# Patient Record
Sex: MALE | Race: WHITE | NOT HISPANIC OR LATINO | URBAN - METROPOLITAN AREA
[De-identification: names, ages, dates, MRNs, and addresses within clinical notes are randomized per-mention and may not be internally consistent; named-entity substitution may affect disease eponyms.]

---

## 2018-12-23 ENCOUNTER — INPATIENT (INPATIENT)
Facility: HOSPITAL | Age: 78
LOS: 1 days | Discharge: HOME | End: 2018-12-25
Attending: INTERNAL MEDICINE | Admitting: INTERNAL MEDICINE
Payer: MEDICARE

## 2018-12-23 VITALS
RESPIRATION RATE: 18 BRPM | HEART RATE: 126 BPM | TEMPERATURE: 97 F | SYSTOLIC BLOOD PRESSURE: 143 MMHG | OXYGEN SATURATION: 98 % | DIASTOLIC BLOOD PRESSURE: 98 MMHG

## 2018-12-23 DIAGNOSIS — I48.91 UNSPECIFIED ATRIAL FIBRILLATION: ICD-10-CM

## 2018-12-23 DIAGNOSIS — N40.0 BENIGN PROSTATIC HYPERPLASIA WITHOUT LOWER URINARY TRACT SYMPTOMS: ICD-10-CM

## 2018-12-23 DIAGNOSIS — I10 ESSENTIAL (PRIMARY) HYPERTENSION: ICD-10-CM

## 2018-12-23 DIAGNOSIS — I25.10 ATHEROSCLEROTIC HEART DISEASE OF NATIVE CORONARY ARTERY WITHOUT ANGINA PECTORIS: ICD-10-CM

## 2018-12-23 DIAGNOSIS — E78.00 PURE HYPERCHOLESTEROLEMIA, UNSPECIFIED: ICD-10-CM

## 2018-12-23 DIAGNOSIS — M10.9 GOUT, UNSPECIFIED: ICD-10-CM

## 2018-12-23 LAB
ALBUMIN SERPL ELPH-MCNC: 4.5 G/DL — SIGNIFICANT CHANGE UP (ref 3.3–5)
ALP SERPL-CCNC: 81 U/L — SIGNIFICANT CHANGE UP (ref 40–120)
ALT FLD-CCNC: 19 U/L — SIGNIFICANT CHANGE UP (ref 4–41)
APTT BLD: 30.4 SEC — SIGNIFICANT CHANGE UP (ref 27.5–36.3)
AST SERPL-CCNC: 29 U/L — SIGNIFICANT CHANGE UP (ref 4–40)
BASE EXCESS BLDV CALC-SCNC: -2 MMOL/L — SIGNIFICANT CHANGE UP
BASOPHILS # BLD AUTO: 0.03 K/UL — SIGNIFICANT CHANGE UP (ref 0–0.2)
BASOPHILS NFR BLD AUTO: 0.3 % — SIGNIFICANT CHANGE UP (ref 0–2)
BILIRUB SERPL-MCNC: 0.6 MG/DL — SIGNIFICANT CHANGE UP (ref 0.2–1.2)
BLOOD GAS VENOUS - CREATININE: 0.92 MG/DL — SIGNIFICANT CHANGE UP (ref 0.5–1.3)
BUN SERPL-MCNC: 18 MG/DL — SIGNIFICANT CHANGE UP (ref 7–23)
CALCIUM SERPL-MCNC: 9.8 MG/DL — SIGNIFICANT CHANGE UP (ref 8.4–10.5)
CHLORIDE BLDV-SCNC: 108 MMOL/L — SIGNIFICANT CHANGE UP (ref 96–108)
CHLORIDE SERPL-SCNC: 104 MMOL/L — SIGNIFICANT CHANGE UP (ref 98–107)
CK SERPL-CCNC: 79 U/L — SIGNIFICANT CHANGE UP (ref 30–200)
CO2 SERPL-SCNC: 20 MMOL/L — LOW (ref 22–31)
CREAT SERPL-MCNC: 1.08 MG/DL — SIGNIFICANT CHANGE UP (ref 0.5–1.3)
EOSINOPHIL # BLD AUTO: 0.19 K/UL — SIGNIFICANT CHANGE UP (ref 0–0.5)
EOSINOPHIL NFR BLD AUTO: 2.1 % — SIGNIFICANT CHANGE UP (ref 0–6)
GAS PNL BLDV: 144 MMOL/L — SIGNIFICANT CHANGE UP (ref 136–146)
GLUCOSE BLDV-MCNC: 138 — HIGH (ref 70–99)
GLUCOSE SERPL-MCNC: 125 MG/DL — HIGH (ref 70–99)
HCO3 BLDV-SCNC: 21 MMOL/L — SIGNIFICANT CHANGE UP (ref 20–27)
HCT VFR BLD CALC: 46.7 % — SIGNIFICANT CHANGE UP (ref 39–50)
HCT VFR BLDV CALC: 48 % — SIGNIFICANT CHANGE UP (ref 39–51)
HGB BLD-MCNC: 15.4 G/DL — SIGNIFICANT CHANGE UP (ref 13–17)
HGB BLDV-MCNC: 15.7 G/DL — SIGNIFICANT CHANGE UP (ref 13–17)
IMM GRANULOCYTES # BLD AUTO: 0.05 # — SIGNIFICANT CHANGE UP
IMM GRANULOCYTES NFR BLD AUTO: 0.5 % — SIGNIFICANT CHANGE UP (ref 0–1.5)
INR BLD: 1.06 — SIGNIFICANT CHANGE UP (ref 0.88–1.17)
LACTATE BLDV-MCNC: 2.8 MMOL/L — HIGH (ref 0.5–2)
LYMPHOCYTES # BLD AUTO: 2.07 K/UL — SIGNIFICANT CHANGE UP (ref 1–3.3)
LYMPHOCYTES # BLD AUTO: 22.6 % — SIGNIFICANT CHANGE UP (ref 13–44)
MAGNESIUM SERPL-MCNC: 1.9 MG/DL — SIGNIFICANT CHANGE UP (ref 1.6–2.6)
MCHC RBC-ENTMCNC: 32.2 PG — SIGNIFICANT CHANGE UP (ref 27–34)
MCHC RBC-ENTMCNC: 33 % — SIGNIFICANT CHANGE UP (ref 32–36)
MCV RBC AUTO: 97.5 FL — SIGNIFICANT CHANGE UP (ref 80–100)
MONOCYTES # BLD AUTO: 1.4 K/UL — HIGH (ref 0–0.9)
MONOCYTES NFR BLD AUTO: 15.3 % — HIGH (ref 2–14)
NEUTROPHILS # BLD AUTO: 5.41 K/UL — SIGNIFICANT CHANGE UP (ref 1.8–7.4)
NEUTROPHILS NFR BLD AUTO: 59.2 % — SIGNIFICANT CHANGE UP (ref 43–77)
NRBC # FLD: 0 — SIGNIFICANT CHANGE UP
NT-PROBNP SERPL-SCNC: 874.4 PG/ML — SIGNIFICANT CHANGE UP
PCO2 BLDV: 50 MMHG — SIGNIFICANT CHANGE UP (ref 41–51)
PH BLDV: 7.3 PH — LOW (ref 7.32–7.43)
PHOSPHATE SERPL-MCNC: 4 MG/DL — SIGNIFICANT CHANGE UP (ref 2.5–4.5)
PLATELET # BLD AUTO: 166 K/UL — SIGNIFICANT CHANGE UP (ref 150–400)
PMV BLD: 12.2 FL — SIGNIFICANT CHANGE UP (ref 7–13)
PO2 BLDV: 37 MMHG — SIGNIFICANT CHANGE UP (ref 35–40)
POTASSIUM BLDV-SCNC: 3.8 MMOL/L — SIGNIFICANT CHANGE UP (ref 3.4–4.5)
POTASSIUM SERPL-MCNC: 4.3 MMOL/L — SIGNIFICANT CHANGE UP (ref 3.5–5.3)
POTASSIUM SERPL-SCNC: 4.3 MMOL/L — SIGNIFICANT CHANGE UP (ref 3.5–5.3)
PROT SERPL-MCNC: 7.9 G/DL — SIGNIFICANT CHANGE UP (ref 6–8.3)
PROTHROM AB SERPL-ACNC: 11.8 SEC — SIGNIFICANT CHANGE UP (ref 9.8–13.1)
RBC # BLD: 4.79 M/UL — SIGNIFICANT CHANGE UP (ref 4.2–5.8)
RBC # FLD: 13.2 % — SIGNIFICANT CHANGE UP (ref 10.3–14.5)
SAO2 % BLDV: 60.8 % — SIGNIFICANT CHANGE UP (ref 60–85)
SODIUM SERPL-SCNC: 143 MMOL/L — SIGNIFICANT CHANGE UP (ref 135–145)
TROPONIN T, HIGH SENSITIVITY: 13 NG/L — SIGNIFICANT CHANGE UP (ref ?–14)
TSH SERPL-MCNC: 3.45 UIU/ML — SIGNIFICANT CHANGE UP (ref 0.27–4.2)
WBC # BLD: 9.15 K/UL — SIGNIFICANT CHANGE UP (ref 3.8–10.5)
WBC # FLD AUTO: 9.15 K/UL — SIGNIFICANT CHANGE UP (ref 3.8–10.5)

## 2018-12-23 PROCEDURE — 71046 X-RAY EXAM CHEST 2 VIEWS: CPT | Mod: 26

## 2018-12-23 RX ORDER — SODIUM CHLORIDE 9 MG/ML
1000 INJECTION INTRAMUSCULAR; INTRAVENOUS; SUBCUTANEOUS ONCE
Qty: 0 | Refills: 0 | Status: COMPLETED | OUTPATIENT
Start: 2018-12-23 | End: 2018-12-23

## 2018-12-23 RX ORDER — ALLOPURINOL 300 MG
0 TABLET ORAL
Qty: 90 | Refills: 0 | COMMUNITY

## 2018-12-23 RX ORDER — ALLOPURINOL 300 MG
300 TABLET ORAL DAILY
Qty: 0 | Refills: 0 | Status: DISCONTINUED | OUTPATIENT
Start: 2018-12-23 | End: 2018-12-25

## 2018-12-23 RX ORDER — ATENOLOL 25 MG/1
1 TABLET ORAL
Qty: 0 | Refills: 0 | COMMUNITY

## 2018-12-23 RX ORDER — PYRIDOXINE HCL (VITAMIN B6) 100 MG
100 TABLET ORAL DAILY
Qty: 0 | Refills: 0 | Status: DISCONTINUED | OUTPATIENT
Start: 2018-12-23 | End: 2018-12-25

## 2018-12-23 RX ORDER — GABAPENTIN 400 MG/1
0 CAPSULE ORAL
Qty: 180 | Refills: 0 | COMMUNITY

## 2018-12-23 RX ORDER — FINASTERIDE 5 MG/1
0 TABLET, FILM COATED ORAL
Qty: 90 | Refills: 0 | COMMUNITY

## 2018-12-23 RX ORDER — ATENOLOL 25 MG/1
25 TABLET ORAL DAILY
Qty: 0 | Refills: 0 | Status: DISCONTINUED | OUTPATIENT
Start: 2018-12-23 | End: 2018-12-25

## 2018-12-23 RX ORDER — SIMVASTATIN 20 MG/1
10 TABLET, FILM COATED ORAL AT BEDTIME
Qty: 0 | Refills: 0 | Status: DISCONTINUED | OUTPATIENT
Start: 2018-12-23 | End: 2018-12-25

## 2018-12-23 RX ORDER — SODIUM CHLORIDE 9 MG/ML
3 INJECTION INTRAMUSCULAR; INTRAVENOUS; SUBCUTANEOUS EVERY 8 HOURS
Qty: 0 | Refills: 0 | Status: DISCONTINUED | OUTPATIENT
Start: 2018-12-23 | End: 2018-12-25

## 2018-12-23 RX ORDER — HEPARIN SODIUM 5000 [USP'U]/ML
5000 INJECTION INTRAVENOUS; SUBCUTANEOUS EVERY 12 HOURS
Qty: 0 | Refills: 0 | Status: DISCONTINUED | OUTPATIENT
Start: 2018-12-23 | End: 2018-12-24

## 2018-12-23 RX ORDER — SIMVASTATIN 20 MG/1
0 TABLET, FILM COATED ORAL
Qty: 90 | Refills: 0 | COMMUNITY

## 2018-12-23 RX ORDER — CLOPIDOGREL BISULFATE 75 MG/1
75 TABLET, FILM COATED ORAL DAILY
Qty: 0 | Refills: 0 | Status: DISCONTINUED | OUTPATIENT
Start: 2018-12-23 | End: 2018-12-24

## 2018-12-23 RX ORDER — CHOLECALCIFEROL (VITAMIN D3) 125 MCG
400 CAPSULE ORAL DAILY
Qty: 0 | Refills: 0 | Status: DISCONTINUED | OUTPATIENT
Start: 2018-12-23 | End: 2018-12-25

## 2018-12-23 RX ORDER — GABAPENTIN 400 MG/1
100 CAPSULE ORAL DAILY
Qty: 0 | Refills: 0 | Status: DISCONTINUED | OUTPATIENT
Start: 2018-12-23 | End: 2018-12-25

## 2018-12-23 RX ORDER — PYRIDOXINE HCL (VITAMIN B6) 100 MG
1 TABLET ORAL
Qty: 0 | Refills: 0 | COMMUNITY

## 2018-12-23 RX ORDER — ASPIRIN/CALCIUM CARB/MAGNESIUM 324 MG
325 TABLET ORAL DAILY
Qty: 0 | Refills: 0 | Status: DISCONTINUED | OUTPATIENT
Start: 2018-12-23 | End: 2018-12-24

## 2018-12-23 RX ORDER — ERGOCALCIFEROL 1.25 MG/1
1 CAPSULE ORAL
Qty: 0 | Refills: 0 | COMMUNITY

## 2018-12-23 RX ORDER — ASPIRIN/CALCIUM CARB/MAGNESIUM 324 MG
1 TABLET ORAL
Qty: 0 | Refills: 0 | COMMUNITY

## 2018-12-23 RX ORDER — FINASTERIDE 5 MG/1
5 TABLET, FILM COATED ORAL DAILY
Qty: 0 | Refills: 0 | Status: DISCONTINUED | OUTPATIENT
Start: 2018-12-23 | End: 2018-12-25

## 2018-12-23 RX ADMIN — SODIUM CHLORIDE 1000 MILLILITER(S): 9 INJECTION INTRAMUSCULAR; INTRAVENOUS; SUBCUTANEOUS at 17:32

## 2018-12-23 RX ADMIN — SODIUM CHLORIDE 3 MILLILITER(S): 9 INJECTION INTRAMUSCULAR; INTRAVENOUS; SUBCUTANEOUS at 21:34

## 2018-12-23 RX ADMIN — GABAPENTIN 100 MILLIGRAM(S): 400 CAPSULE ORAL at 23:22

## 2018-12-23 RX ADMIN — SIMVASTATIN 10 MILLIGRAM(S): 20 TABLET, FILM COATED ORAL at 23:22

## 2018-12-23 NOTE — H&P ADULT - FAMILY HISTORY
Father  Still living? No  Family history of cancer of mouth, Age at diagnosis: Age Unknown     Mother  Still living? No  Family history of dementia, Age at diagnosis: Age Unknown     Sibling  Still living? No  Family history of lung cancer, Age at diagnosis: Age Unknown

## 2018-12-23 NOTE — ED PROVIDER NOTE - ATTENDING CONTRIBUTION TO CARE
I performed a face-to-face evaluation of the patient and performed a history and physical examination. I agree with the history and physical examination.    Trixie: New-onset a. fib. -120. Check TSH, trop, CXR. Admit for heparin drip and starting anticoagulation.

## 2018-12-23 NOTE — ED ADULT NURSE NOTE - CHPI ED NUR SYMPTOMS NEG
no shortness of breath/no fever/no syncope/no vomiting/no nausea/no chest pain/no dizziness/no diaphoresis

## 2018-12-23 NOTE — ED ADULT NURSE NOTE - PSH
CABG (Coronary Artery Bypass Graft)  x4 in 2000  Cataract cortical, senile    Clubfoot, congenital    History of Colonoscopy    Incarcerated Inguinal Hernia, Unilateral    Stented coronary artery  2 stents in 2010

## 2018-12-23 NOTE — ED ADULT NURSE NOTE - OBJECTIVE STATEMENT
Facilitator RN:  received pt in room 4, c/o diarrhea x3 days.  Pt went to Urgent Care, had EKG done.  EKG shows new onset A-fib.  Pt denies history of A-fib/abnormal heart rate.  PMHx cardiac bypass, stents x6.  Pt AAOx3, respirations even and unlabored.  A-fib on monitor.  Pt denies shortness of breath, N/V.  Labs drawn and sent; IV access obtained.  Report given to primary RN Nhi.

## 2018-12-23 NOTE — ED PROVIDER NOTE - MEDICAL DECISION MAKING DETAILS
Trixie: New-onset a. fib. -120. Check TSH, trop, CXR. Admit for heparin drip and starting anticoagulation.

## 2018-12-23 NOTE — H&P ADULT - PROBLEM SELECTOR PLAN 1
tele  repeat echo  cardiac eval  c/w Atenolol  case d/w Attg Dr Segovia - will hold off on AC for now, await cardiac eval

## 2018-12-23 NOTE — ED PROVIDER NOTE - MUSCULOSKELETAL, MLM
Spine appears normal, range of motion is not limited, no muscle or joint tenderness L calf atrophied 2/2 disuse from L club foot . No LE edema.

## 2018-12-23 NOTE — H&P ADULT - HISTORY OF PRESENT ILLNESS
This is a  79 yo  Male with pmh/o , CAD (CABG x 4, stents x 6- last done about 7 yrs ago at [facility])), HTN, Arthritis, gout, HLD, BPH, was on digoxin in past ( was taken off about 3-4 yrs ago)  Went to [facility] urgent care today for flu-like Sx and diarrhea - which stopped today (was on Abx for 10 days last month for URI ). At [facility] found to be  Afib w/ RVR. Pt  only c/o diffuse myalgias. Denies any palpitaions, chest pain, SOB, Nausea, vomiting, abdominal pain. Pt appears comfortable NAD, AO x3, CP free.

## 2018-12-23 NOTE — ED PROVIDER NOTE - PMH
Arthritis, Gouty    BPH (Benign Prostatic Hypertrophy)    CAD (Coronary Artery Disease)    Hypercholesteremia    Hypertension

## 2018-12-23 NOTE — H&P ADULT - NSHPLABSRESULTS_GEN_ALL_CORE
15.4   9.15  )-----------( 166      ( 23 Dec 2018 15:40 )             46.7   12-23    143  |  104  |  18  ----------------------------<  125<H>  4.3   |  20<L>  |  1.08    Ca    9.8      23 Dec 2018 15:40  Phos  4.0     12-23  Mg     1.9     12-23    TPro  7.9  /  Alb  4.5  /  TBili  0.6  /  DBili  x   /  AST  29  /  ALT  19  /  AlkPhos  81  12-23

## 2018-12-23 NOTE — ED ADULT TRIAGE NOTE - CHIEF COMPLAINT QUOTE
states" I am having diarrhea since Wednesday and went to [facility] and EKG shows Irregular heart beat. denies any complaints. EKG shows rapid Afib.

## 2018-12-23 NOTE — ED ADULT NURSE NOTE - CHIEF COMPLAINT QUOTE
states" I am having diarrhea since Wednesday and went to [facility] and EKG shows Irregular heart beat. denies any complaints. EKG shows rapid Afib.

## 2018-12-23 NOTE — ED PROVIDER NOTE - OBJECTIVE STATEMENT
Trixie: 78 M, CAD (CABG, stents). Went to [facility] urgent care for flu-like Sx and diarrhea (was on Abx last month), which stopped today. Found there to be in a. fib.    Meds:  allopurinol  ASA  Enalapril  Simva  Finasteride  Neurontin  Atenolol Trixie: 78 M, CAD (CABG, stents), htn, was on digoxin in past. Went to [facility] urgent care for flu-like Sx and diarrhea (was on Abx last month), which stopped today. Found there to be in a. fib. only c/o diffuse myalgias. ROS negative for: fever, chest pain, SOB, Nausea, vomiting, diarrhea, abdominal pain,     Meds:  allopurinol  ASA  Enalapril  Simva  Finasteride  Neurontin  Atenolol

## 2018-12-24 LAB
ALBUMIN SERPL ELPH-MCNC: 3.9 G/DL — SIGNIFICANT CHANGE UP (ref 3.3–5)
ALP SERPL-CCNC: 65 U/L — SIGNIFICANT CHANGE UP (ref 40–120)
ALT FLD-CCNC: 16 U/L — SIGNIFICANT CHANGE UP (ref 4–41)
AST SERPL-CCNC: 16 U/L — SIGNIFICANT CHANGE UP (ref 4–40)
BASOPHILS # BLD AUTO: 0.03 K/UL — SIGNIFICANT CHANGE UP (ref 0–0.2)
BASOPHILS NFR BLD AUTO: 0.5 % — SIGNIFICANT CHANGE UP (ref 0–2)
BILIRUB SERPL-MCNC: 0.7 MG/DL — SIGNIFICANT CHANGE UP (ref 0.2–1.2)
BUN SERPL-MCNC: 16 MG/DL — SIGNIFICANT CHANGE UP (ref 7–23)
C DIFF TOX GENS STL QL NAA+PROBE: SIGNIFICANT CHANGE UP
CALCIUM SERPL-MCNC: 9 MG/DL — SIGNIFICANT CHANGE UP (ref 8.4–10.5)
CHLORIDE SERPL-SCNC: 106 MMOL/L — SIGNIFICANT CHANGE UP (ref 98–107)
CHOLEST SERPL-MCNC: 138 MG/DL — SIGNIFICANT CHANGE UP (ref 120–199)
CO2 SERPL-SCNC: 21 MMOL/L — LOW (ref 22–31)
CREAT SERPL-MCNC: 0.92 MG/DL — SIGNIFICANT CHANGE UP (ref 0.5–1.3)
EOSINOPHIL # BLD AUTO: 0.32 K/UL — SIGNIFICANT CHANGE UP (ref 0–0.5)
EOSINOPHIL NFR BLD AUTO: 5 % — SIGNIFICANT CHANGE UP (ref 0–6)
GLUCOSE SERPL-MCNC: 94 MG/DL — SIGNIFICANT CHANGE UP (ref 70–99)
HBA1C BLD-MCNC: 5.8 % — HIGH (ref 4–5.6)
HCT VFR BLD CALC: 41.7 % — SIGNIFICANT CHANGE UP (ref 39–50)
HDLC SERPL-MCNC: 41 MG/DL — SIGNIFICANT CHANGE UP (ref 35–55)
HGB BLD-MCNC: 13.9 G/DL — SIGNIFICANT CHANGE UP (ref 13–17)
IMM GRANULOCYTES # BLD AUTO: 0.02 # — SIGNIFICANT CHANGE UP
IMM GRANULOCYTES NFR BLD AUTO: 0.3 % — SIGNIFICANT CHANGE UP (ref 0–1.5)
LIPID PNL WITH DIRECT LDL SERPL: 81 MG/DL — SIGNIFICANT CHANGE UP
LYMPHOCYTES # BLD AUTO: 1.36 K/UL — SIGNIFICANT CHANGE UP (ref 1–3.3)
LYMPHOCYTES # BLD AUTO: 21.2 % — SIGNIFICANT CHANGE UP (ref 13–44)
MAGNESIUM SERPL-MCNC: 1.8 MG/DL — SIGNIFICANT CHANGE UP (ref 1.6–2.6)
MCHC RBC-ENTMCNC: 32.8 PG — SIGNIFICANT CHANGE UP (ref 27–34)
MCHC RBC-ENTMCNC: 33.3 % — SIGNIFICANT CHANGE UP (ref 32–36)
MCV RBC AUTO: 98.3 FL — SIGNIFICANT CHANGE UP (ref 80–100)
MONOCYTES # BLD AUTO: 0.86 K/UL — SIGNIFICANT CHANGE UP (ref 0–0.9)
MONOCYTES NFR BLD AUTO: 13.4 % — SIGNIFICANT CHANGE UP (ref 2–14)
NEUTROPHILS # BLD AUTO: 3.83 K/UL — SIGNIFICANT CHANGE UP (ref 1.8–7.4)
NEUTROPHILS NFR BLD AUTO: 59.6 % — SIGNIFICANT CHANGE UP (ref 43–77)
NRBC # FLD: 0 — SIGNIFICANT CHANGE UP
PHOSPHATE SERPL-MCNC: 3.1 MG/DL — SIGNIFICANT CHANGE UP (ref 2.5–4.5)
PLATELET # BLD AUTO: 143 K/UL — LOW (ref 150–400)
PMV BLD: 11.8 FL — SIGNIFICANT CHANGE UP (ref 7–13)
POTASSIUM SERPL-MCNC: 3.7 MMOL/L — SIGNIFICANT CHANGE UP (ref 3.5–5.3)
POTASSIUM SERPL-SCNC: 3.7 MMOL/L — SIGNIFICANT CHANGE UP (ref 3.5–5.3)
PROT SERPL-MCNC: 6.5 G/DL — SIGNIFICANT CHANGE UP (ref 6–8.3)
RBC # BLD: 4.24 M/UL — SIGNIFICANT CHANGE UP (ref 4.2–5.8)
RBC # FLD: 13.2 % — SIGNIFICANT CHANGE UP (ref 10.3–14.5)
SODIUM SERPL-SCNC: 141 MMOL/L — SIGNIFICANT CHANGE UP (ref 135–145)
TRIGL SERPL-MCNC: 145 MG/DL — SIGNIFICANT CHANGE UP (ref 10–149)
TSH SERPL-MCNC: 3.43 UIU/ML — SIGNIFICANT CHANGE UP (ref 0.27–4.2)
WBC # BLD: 6.42 K/UL — SIGNIFICANT CHANGE UP (ref 3.8–10.5)
WBC # FLD AUTO: 6.42 K/UL — SIGNIFICANT CHANGE UP (ref 3.8–10.5)

## 2018-12-24 RX ORDER — APIXABAN 2.5 MG/1
5 TABLET, FILM COATED ORAL EVERY 12 HOURS
Qty: 0 | Refills: 0 | Status: DISCONTINUED | OUTPATIENT
Start: 2018-12-24 | End: 2018-12-25

## 2018-12-24 RX ORDER — ASPIRIN/CALCIUM CARB/MAGNESIUM 324 MG
81 TABLET ORAL DAILY
Qty: 0 | Refills: 0 | Status: DISCONTINUED | OUTPATIENT
Start: 2018-12-24 | End: 2018-12-25

## 2018-12-24 RX ORDER — APIXABAN 2.5 MG/1
2 TABLET, FILM COATED ORAL
Qty: 120 | Refills: 0 | OUTPATIENT
Start: 2018-12-24 | End: 2019-01-22

## 2018-12-24 RX ADMIN — Medication 400 UNIT(S): at 12:56

## 2018-12-24 RX ADMIN — SODIUM CHLORIDE 3 MILLILITER(S): 9 INJECTION INTRAMUSCULAR; INTRAVENOUS; SUBCUTANEOUS at 05:00

## 2018-12-24 RX ADMIN — Medication 300 MILLIGRAM(S): at 12:56

## 2018-12-24 RX ADMIN — Medication 10 MILLIGRAM(S): at 05:01

## 2018-12-24 RX ADMIN — ATENOLOL 25 MILLIGRAM(S): 25 TABLET ORAL at 05:01

## 2018-12-24 RX ADMIN — SODIUM CHLORIDE 3 MILLILITER(S): 9 INJECTION INTRAMUSCULAR; INTRAVENOUS; SUBCUTANEOUS at 21:12

## 2018-12-24 RX ADMIN — SIMVASTATIN 10 MILLIGRAM(S): 20 TABLET, FILM COATED ORAL at 21:15

## 2018-12-24 RX ADMIN — Medication 325 MILLIGRAM(S): at 12:56

## 2018-12-24 RX ADMIN — CLOPIDOGREL BISULFATE 75 MILLIGRAM(S): 75 TABLET, FILM COATED ORAL at 12:56

## 2018-12-24 RX ADMIN — SODIUM CHLORIDE 3 MILLILITER(S): 9 INJECTION INTRAMUSCULAR; INTRAVENOUS; SUBCUTANEOUS at 13:59

## 2018-12-24 RX ADMIN — Medication 100 MILLIGRAM(S): at 12:58

## 2018-12-24 RX ADMIN — GABAPENTIN 100 MILLIGRAM(S): 400 CAPSULE ORAL at 12:56

## 2018-12-24 RX ADMIN — FINASTERIDE 5 MILLIGRAM(S): 5 TABLET, FILM COATED ORAL at 12:56

## 2018-12-24 RX ADMIN — APIXABAN 5 MILLIGRAM(S): 2.5 TABLET, FILM COATED ORAL at 17:59

## 2018-12-24 NOTE — CONSULT NOTE ADULT - SUBJECTIVE AND OBJECTIVE BOX
CARDIOLOGY ATTENDING      HISTORY OF PRESENT ILLNESS: He is a pleasant 73 y/o male PMH CAD s/p CABG and subsequently PCI (2013) admitted with asymptomatic newly diagnosed AF. He has already converted back to NSR. Reports having a normal echo with his cardiologist Dr Briceno. He denies any symptoms from his AF, denying palpitations, syncope, nor angina.    PAST MEDICAL & SURGICAL HISTORY:  BPH (Benign Prostatic Hypertrophy)  CAD (Coronary Artery Disease) s/p CABG and PCI (2013)  Hypercholesteremia  Hypertension  paroxysmal atrial fibrillation    MEDICATIONS  (STANDING):  allopurinol 300 milliGRAM(s) Oral daily  apixaban 5 milliGRAM(s) Oral every 12 hours  aspirin enteric coated 81 milliGRAM(s) Oral daily  ATENolol  Tablet 25 milliGRAM(s) Oral daily  cholecalciferol 400 Unit(s) Oral daily  enalapril 10 milliGRAM(s) Oral daily  finasteride 5 milliGRAM(s) Oral daily  gabapentin 100 milliGRAM(s) Oral daily  pyridoxine 100 milliGRAM(s) Oral daily  simvastatin 10 milliGRAM(s) Oral at bedtime  sodium chloride 0.9% lock flush 3 milliLiter(s) IV Push every 8 hours      Allergies    No Known Allergies    Intolerances        FAMILY HISTORY:  Family history of lung cancer (Sibling)  Family history of dementia (Mother)  Family history of cancer of mouth (Father): father    Non-contributary for premature coronary disease or sudden cardiac death    SOCIAL HISTORY:    [x ] Non-smoker  [ ] Smoker  [ ] Alcohol      REVIEW OF SYSTEMS:  [ ]chest pain  [  ]shortness of breath  [  ]palpitations  [  ]syncope  [ ]near syncope [ ]upper extremity weakness   [ ] lower extremity weakness  [  ]diplopia  [  ]altered mental status   [  ]fevers  [ ]chills [ ]nausea  [ ]vomitting  [  ]dysphagia    [ ]abdominal pain  [ ]melena  [ ]BRBPR    [  ]epistaxis  [  ]rash    [ ]lower extremity edema        [x ] All others negative	  [ ] Unable to obtain    PHYSICAL EXAM:  T(C): 36.6 (12-24-18 @ 14:42), Max: 36.9 (12-24-18 @ 10:22)  HR: 75 (12-24-18 @ 14:42) (72 - 126)  BP: 119/71 (12-24-18 @ 14:42) (119/71 - 152/91)  RR: 18 (12-24-18 @ 14:42) (16 - 18)  SpO2: 96% (12-24-18 @ 14:42) (95% - 100%)  Wt(kg): --    no JVD  RRR, no murmurs  CTAB  soft nt/nd  no c/c/e      TELEMETRY: 	    ECG:  	    Echo:  NST:  Cath:  	  	  LABS:	 	                          13.9   6.42  )-----------( 143      ( 24 Dec 2018 05:48 )             41.7     12-24    141  |  106  |  16  ----------------------------<  94  3.7   |  21<L>  |  0.92    Ca    9.0      24 Dec 2018 05:48  Phos  3.1     12-24  Mg     1.8     12-24    TPro  6.5  /  Alb  3.9  /  TBili  0.7  /  DBili  x   /  AST  16  /  ALT  16  /  AlkPhos  65  12-24    proBNP: Serum Pro-Brain Natriuretic Peptide: 874.4 pg/mL (12-23 @ 15:40)    Lipid Profile:   HgA1c: Hemoglobin A1C, Whole Blood: 5.8 % (12-24 @ 05:48)    TSH: Thyroid Stimulating Hormone, Serum: 3.43 uIU/mL (12-24 @ 05:48)  Thyroid Stimulating Hormone, Serum: 3.45 uIU/mL (12-23 @ 15:40)      A/P) He is a pleasant 73 y/o male PMH CAD s/p CABG and subsequently PCI (2013) admitted with asymptomatic newly diagnosed AF. He has already converted back to NSR. Reports having a normal echo with his cardiologist Dr Briceno. He denies any symptoms from his AF, denying palpitations, syncope, nor angina. CHADS-VASC 4    -decrease asa to 81mg daily and d/c plavix  -start eliquis 5 bid for lifelong a/c. Please make sure eliquis is covered by his Rx plan  -d/c home  -would not pursue an aggressive rhythm control strategy (AAD or AF ablation) as he's asymptomatic  -f/u with his cardiologist Dr Briceno after discharge      Jeremias Payne M.D., [facility]  Cardiac Electrophysiology  [facility]  [address] [place] [address], [address] [place] [address], [place] [address]  [email or web address]    office [phone]  pager [phone]

## 2018-12-24 NOTE — PROVIDER CONTACT NOTE (OTHER) - ASSESSMENT
Patient denies chest pain, shortness of breath, palpitations and dizziness
Pt has saturated multiple finger dressings from  fingerstic site

## 2018-12-25 ENCOUNTER — TRANSCRIPTION ENCOUNTER (OUTPATIENT)
Age: 78
End: 2018-12-25

## 2018-12-25 VITALS — WEIGHT: 186.07 LBS

## 2018-12-25 LAB
BUN SERPL-MCNC: 15 MG/DL — SIGNIFICANT CHANGE UP (ref 7–23)
CALCIUM SERPL-MCNC: 9 MG/DL — SIGNIFICANT CHANGE UP (ref 8.4–10.5)
CHLORIDE SERPL-SCNC: 105 MMOL/L — SIGNIFICANT CHANGE UP (ref 98–107)
CO2 SERPL-SCNC: 19 MMOL/L — LOW (ref 22–31)
CREAT SERPL-MCNC: 0.85 MG/DL — SIGNIFICANT CHANGE UP (ref 0.5–1.3)
GLUCOSE SERPL-MCNC: 107 MG/DL — HIGH (ref 70–99)
HCT VFR BLD CALC: 42 % — SIGNIFICANT CHANGE UP (ref 39–50)
HGB BLD-MCNC: 14.2 G/DL — SIGNIFICANT CHANGE UP (ref 13–17)
MCHC RBC-ENTMCNC: 31.9 PG — SIGNIFICANT CHANGE UP (ref 27–34)
MCHC RBC-ENTMCNC: 33.8 % — SIGNIFICANT CHANGE UP (ref 32–36)
MCV RBC AUTO: 94.4 FL — SIGNIFICANT CHANGE UP (ref 80–100)
NRBC # FLD: 0 — SIGNIFICANT CHANGE UP
PLATELET # BLD AUTO: 163 K/UL — SIGNIFICANT CHANGE UP (ref 150–400)
PMV BLD: 12 FL — SIGNIFICANT CHANGE UP (ref 7–13)
POTASSIUM SERPL-MCNC: 3.6 MMOL/L — SIGNIFICANT CHANGE UP (ref 3.5–5.3)
POTASSIUM SERPL-SCNC: 3.6 MMOL/L — SIGNIFICANT CHANGE UP (ref 3.5–5.3)
RBC # BLD: 4.45 M/UL — SIGNIFICANT CHANGE UP (ref 4.2–5.8)
RBC # FLD: 13.1 % — SIGNIFICANT CHANGE UP (ref 10.3–14.5)
SODIUM SERPL-SCNC: 140 MMOL/L — SIGNIFICANT CHANGE UP (ref 135–145)
WBC # BLD: 7.07 K/UL — SIGNIFICANT CHANGE UP (ref 3.8–10.5)
WBC # FLD AUTO: 7.07 K/UL — SIGNIFICANT CHANGE UP (ref 3.8–10.5)

## 2018-12-25 RX ORDER — APIXABAN 2.5 MG/1
1 TABLET, FILM COATED ORAL
Qty: 60 | Refills: 0 | OUTPATIENT
Start: 2018-12-25 | End: 2019-01-23

## 2018-12-25 RX ORDER — CLOPIDOGREL BISULFATE 75 MG/1
0 TABLET, FILM COATED ORAL
Qty: 90 | Refills: 0 | COMMUNITY

## 2018-12-25 RX ADMIN — Medication 10 MILLIGRAM(S): at 05:08

## 2018-12-25 RX ADMIN — APIXABAN 5 MILLIGRAM(S): 2.5 TABLET, FILM COATED ORAL at 05:08

## 2018-12-25 RX ADMIN — ATENOLOL 25 MILLIGRAM(S): 25 TABLET ORAL at 05:08

## 2018-12-25 RX ADMIN — SODIUM CHLORIDE 3 MILLILITER(S): 9 INJECTION INTRAMUSCULAR; INTRAVENOUS; SUBCUTANEOUS at 05:08

## 2018-12-25 NOTE — DISCHARGE NOTE ADULT - MEDICATION SUMMARY - MEDICATIONS TO STOP TAKING
I will STOP taking the medications listed below when I get home from the hospital:    CLOPIDOGREL  TAB 75MG

## 2018-12-25 NOTE — DISCHARGE NOTE ADULT - MEDICATION SUMMARY - MEDICATIONS TO TAKE
I will START or STAY ON the medications listed below when I get home from the hospital:    FINASTERIDE  TAB 5MG  -- Indication: For BPH (benign prostatic hyperplasia)    Aspirin Enteric Coated 325 mg oral delayed release tablet  -- 1 tab(s) by mouth once a day  -- Indication: For CAD (Coronary Artery Disease)    ENALAPRIL    TAB 10MG  -- Indication: For CAD (Coronary Artery Disease)    apixaban 5 mg oral tablet  -- 1 tab(s) by mouth every 12 hours  -- Indication: For Afib    GABAPENTIN   CAP 100MG  -- Indication: For dm    ALLOPURINOL  TAB 300MG  -- Indication: For gout    SIMVASTATIN  TAB 10MG  -- Indication: For Hld    atenolol 25 mg oral tablet  -- 1 tab(s) by mouth once a day  -- Indication: For Afib    Vitamin B6 100 mg oral tablet  -- 1 tab(s) by mouth once a day  -- Indication: For supplement    Vitamin D2 2000 intl units oral capsule  -- 1 cap(s) by mouth once a day  -- Indication: For supplement

## 2018-12-25 NOTE — CHART NOTE - NSCHARTNOTEFT_GEN_A_CORE
Per family and pt request they would like to take patient home today, Family reported they will take full responsibility for eliquis payment if it is not cover by the insurance. They pickup the medication from pharmacy tomorrow, in the event they pharamacy does not have supply they would call their cardiologist Dr Marquez for samples or call the [facility] floor to resend Rx to different pharmacy. Plan agreed by attending. will discharge pt today

## 2018-12-25 NOTE — PROGRESS NOTE ADULT - ATTENDING COMMENTS
Patient seen and examined.  Agree with above.   No further inpatient cardiac workup needed at this time.   Continue with single antiplt therapy and ac for cad and afib respectively    Doris Meng MD

## 2018-12-25 NOTE — PROGRESS NOTE ADULT - PROBLEM SELECTOR PLAN 1
tele  repeat echo  cardiac eval appreciated  c/w Atenolol  will check pharamcy to see if eliquis corrected

## 2018-12-25 NOTE — DISCHARGE NOTE ADULT - PATIENT PORTAL LINK FT
You can access the [facility] Patient Portal, offered by [facility], by registering with the following website: [email or web address]

## 2018-12-25 NOTE — DISCHARGE NOTE ADULT - CARE PLAN
Principal Discharge DX:	Atrial fibrillation  Goal:	To restore or maintain a normal heart rate and rhythm, to prevent blood clots, and decrease the risks of stroke CVA/TIA.  Assessment and plan of treatment:	Please take your medications as prescribed. please contact your cardiologist or the [facility] floor if your pharmacy does not have supply of eliquis. Continue to take your blood thinner as prescribed and .  Low fat diet, reduce caffeine intake, and exercise at least 30 minutes daily. Follow up with your cardiologist in 1 week  Secondary Diagnosis:	CAD (Coronary Artery Disease)  Goal:	To be asymptomatic, to reduce risks factors such as hypertension, diabetes and hyperlipidemia to lower the risk of blood clots formation; and to prevent complications of coronary artery disease such as worsening chest pain, heart attack and death.  Assessment and plan of treatment:	Continue aspirin, your plavix was discontinued since your are started on eliquis for anticoagulation.   Continue low salt, fat, cholesterol and carbohydrate diet. Follow up with cardiologist and primary care physician's routine appointment.  Secondary Diagnosis:	Hypertension  Goal:	To maintain a normal blood pressure to prevent heart attack, stroke and renal failure.  Assessment and plan of treatment:	Low sodium and fat diet, continue anti-hypertensive medications, and follow up with primary care physician.  Secondary Diagnosis:	Hypercholesteremia  Goal:	To maintain normal cholesterol levels to prevent stroke, coronary artery disease, peripheral vascular disease and heart attacks.  Assessment and plan of treatment:	Low fat diet, exercise daily and continue current medications. Follow up with primary care physician and cardiologist for management.  Secondary Diagnosis:	Arthritis, Gouty  Assessment and plan of treatment:	continue home medication

## 2018-12-25 NOTE — PROGRESS NOTE ADULT - SUBJECTIVE AND OBJECTIVE BOX
Patient is a 78y old  Male who presents with a chief complaint of send by MD for new onset afib (25 Dec 2018 04:05)      SUBJECTIVE / OVERNIGHT EVENTS:    Went back into afib yesterday evening  Astympatomic currently    Vital Signs Last 24 Hrs  T(C): 37 (25 Dec 2018 04:30), Max: 37 (25 Dec 2018 04:30)  T(F): 98.6 (25 Dec 2018 04:30), Max: 98.6 (25 Dec 2018 04:30)  HR: 98 (25 Dec 2018 04:30) (75 - 98)  BP: 130/73 (25 Dec 2018 04:30) (119/71 - 152/76)  BP(mean): --  RR: 18 (25 Dec 2018 04:30) (16 - 189)  SpO2: 96% (25 Dec 2018 04:30) (96% - 98%)  I&O's Summary    24 Dec 2018 07:01  -  25 Dec 2018 07:00  --------------------------------------------------------  IN: 300 mL / OUT: 400 mL / NET: -100 mL        GENERAL: NAD, AAOx3  HEAD:  Atraumatic, Normocephalic  EYES: EOMI, PERRLA, conjunctiva and sclera clear  NECK: Supple, No JVD, No LAD  CHEST/LUNG: Clear to auscultation bilaterally; No wheeze  HEART: Irregular rate and rhythm; No murmurs, rubs, or gallops  ABDOMEN: Soft, Nontender, Nondistended; Bowel sounds present  EXTREMITIES:  2+ Peripheral Pulses, No clubbing, cyanosis, or edema  SKIN: No rashes or lesions  NEURO: nonfocal CN/motor/sensory/reflexes    LABS:                        14.2   7.07  )-----------( 163      ( 25 Dec 2018 07:04 )             42.0     12-25    140  |  105  |  15  ----------------------------<  107<H>  3.6   |  19<L>  |  0.85    Ca    9.0      25 Dec 2018 07:04  Phos  3.1     12-24  Mg     1.8     12-24    TPro  6.5  /  Alb  3.9  /  TBili  0.7  /  DBili  x   /  AST  16  /  ALT  16  /  AlkPhos  65  12-24    PT/INR - ( 23 Dec 2018 15:40 )   PT: 11.8 SEC;   INR: 1.06          PTT - ( 23 Dec 2018 15:40 )  PTT:30.4 SEC  CAPILLARY BLOOD GLUCOSE        CARDIAC MARKERS ( 23 Dec 2018 15:40 )  x     / x     / 79 u/L / x     / x              RADIOLOGY & ADDITIONAL TESTS:    Imaging Personally Reviewed:  [x] YES  [ ] NO    Consultant(s) Notes Reviewed:  [x] YES  [ ] NO      MEDICATIONS  (STANDING):  allopurinol 300 milliGRAM(s) Oral daily  apixaban 5 milliGRAM(s) Oral every 12 hours  aspirin enteric coated 81 milliGRAM(s) Oral daily  ATENolol  Tablet 25 milliGRAM(s) Oral daily  cholecalciferol 400 Unit(s) Oral daily  enalapril 10 milliGRAM(s) Oral daily  finasteride 5 milliGRAM(s) Oral daily  gabapentin 100 milliGRAM(s) Oral daily  pyridoxine 100 milliGRAM(s) Oral daily  simvastatin 10 milliGRAM(s) Oral at bedtime  sodium chloride 0.9% lock flush 3 milliLiter(s) IV Push every 8 hours    MEDICATIONS  (PRN):      Care Discussed with Consultants/Other Providers [x] YES  [ ] NO    HEALTH ISSUES - PROBLEM Dx:  BPH (benign prostatic hyperplasia): BPH (benign prostatic hyperplasia)  Hypertension: Hypertension  Hypercholesteremia: Hypercholesteremia  CAD (Coronary Artery Disease): CAD (Coronary Artery Disease)  Arthritis, Gouty: Arthritis, Gouty  Atrial fibrillation: Atrial fibrillation
Subjective:  pt seen and examined, no complaints, ROS - .   tele : Afib 80-90     allopurinol 300 milliGRAM(s) Oral daily  apixaban 5 milliGRAM(s) Oral every 12 hours  aspirin enteric coated 81 milliGRAM(s) Oral daily  ATENolol  Tablet 25 milliGRAM(s) Oral daily  cholecalciferol 400 Unit(s) Oral daily  enalapril 10 milliGRAM(s) Oral daily  finasteride 5 milliGRAM(s) Oral daily  gabapentin 100 milliGRAM(s) Oral daily  pyridoxine 100 milliGRAM(s) Oral daily  simvastatin 10 milliGRAM(s) Oral at bedtime  sodium chloride 0.9% lock flush 3 milliLiter(s) IV Push every 8 hours                            13.9   6.42  )-----------( 143      ( 24 Dec 2018 05:48 )             41.7       Hemoglobin: 13.9 g/dL (12-24 @ 05:48)  Hemoglobin: 15.4 g/dL (12-23 @ 15:40)      12-24    141  |  106  |  16  ----------------------------<  94  3.7   |  21<L>  |  0.92    Ca    9.0      24 Dec 2018 05:48  Phos  3.1     12-24  Mg     1.8     12-24    TPro  6.5  /  Alb  3.9  /  TBili  0.7  /  DBili  x   /  AST  16  /  ALT  16  /  AlkPhos  65  12-24    Creatinine Trend: 0.92<--, 1.08<--    COAGS:     CARDIAC MARKERS ( 23 Dec 2018 15:40 )  x     / x     / 79 u/L / x     / x            T(C): 36.9 (12-24-18 @ 21:11), Max: 36.9 (12-24-18 @ 10:22)  HR: 90 (12-24-18 @ 21:11) (75 - 92)  BP: 139/78 (12-24-18 @ 21:11) (119/71 - 152/76)  RR: 17 (12-24-18 @ 21:11) (16 - 189)  SpO2: 98% (12-24-18 @ 21:11) (95% - 98%)  Wt(kg): --    I&O's Summary    24 Dec 2018 07:01  -  25 Dec 2018 04:05  --------------------------------------------------------  IN: 200 mL / OUT: 0 mL / NET: 200 mL      Appearance: Normal	  HEENT:   Normal oral mucosa, PERRL, EOMI	  Lymphatic: No lymphadenopathy , no edema  Cardiovascular: Normal S1 S2, No JVD, No murmurs , Peripheral pulses palpable 2+ bilaterally  Respiratory: Lungs clear to auscultation, normal effort 	  Gastrointestinal:  Soft, Non-tender, + BS	  Skin: No rashes, No ecchymoses, No cyanosis, warm to touch  Musculoskeletal: Normal range of motion, normal strength  Psychiatry:  Mood & affect appropriate      A/P) He is a pleasant 73 y/o male PMH CAD s/p CABG and subsequently PCI (2013) admitted with asymptomatic newly diagnosed AF. He has already converted back to NSR. Reports having a normal echo with his cardiologist Dr Briceno. He denies any symptoms from his AF, denying palpitations, syncope, nor angina. CHADS-VASC 4    cont asa only with A/C  , off plavix   cont A/C with eliquis for Afib   cont tight BP control with ACE / BB   Hr better controlled with BB at present.   *would not pursue an aggressive rhythm control strategy (AAD or AF ablation) as he's asymptomatic  *f/u with his cardiologist Dr Briceno after discharge  D/W Dr Meng

## 2018-12-25 NOTE — PROGRESS NOTE ADULT - ASSESSMENT
77 yo male with PMhx of CAD (CABG x 4, stents x 6- last done about 7 yrs ago at [facility])), HTN, Arthritis, gout, HLD, BPH,, adm/w new onset Afib

## 2018-12-25 NOTE — DISCHARGE NOTE ADULT - HOSPITAL COURSE
77 yo  Male with pmh/o , CAD (CABG x 4, stents x 6- last done about 7 yrs ago at [facility])), HTN, Arthritis, gout, HLD, BPH, was on digoxin in past ( was taken off about 3-4 yrs ago)  Went to [facility] urgent care today for flu-like Sx and diarrhea - which stopped today (was on Abx for 10 days last month for URI ). At [facility] found to be  Afib w/ RVR. Pt  only c/o diffuse myalgias. Denies any palpitaions, chest pain, SOB, Nausea, vomiting, abdominal pain. Pt appears comfortable NAD, AO x3, CP free.     Hospital course:  EKG: Afib @ 104 w/ RVR  hsTrop: 13       PLT: 147            HgbA1-c: 5.8                  Gluocse: 125                 BNP: 874.4                Lactate: 2.8  12/23 CXR - no emergent finding follow up official report    Pt admitted for new onset afib later self converted to SR, TTE outpatient with preserved EF. Pt with CHADS 3 started on eliquis. Plavix discontinued given risk of triple therapy. Pt also noted with diarrhea, C diff negative like gastroenteritis. As per Dr Segovia pt cleared for discharge on 12/25

## 2018-12-25 NOTE — DISCHARGE NOTE ADULT - PLAN OF CARE
To restore or maintain a normal heart rate and rhythm, to prevent blood clots, and decrease the risks of stroke CVA/TIA. Please take your medications as prescribed. please contact your cardiologist or the [facility] floor if your pharmacy does not have supply of eliquis. Continue to take your blood thinner as prescribed and .  Low fat diet, reduce caffeine intake, and exercise at least 30 minutes daily. Follow up with your cardiologist in 1 week To be asymptomatic, to reduce risks factors such as hypertension, diabetes and hyperlipidemia to lower the risk of blood clots formation; and to prevent complications of coronary artery disease such as worsening chest pain, heart attack and death. Continue aspirin, your plavix was discontinued since your are started on eliquis for anticoagulation.   Continue low salt, fat, cholesterol and carbohydrate diet. Follow up with cardiologist and primary care physician's routine appointment. To maintain a normal blood pressure to prevent heart attack, stroke and renal failure. Low sodium and fat diet, continue anti-hypertensive medications, and follow up with primary care physician. To maintain normal cholesterol levels to prevent stroke, coronary artery disease, peripheral vascular disease and heart attacks. Low fat diet, exercise daily and continue current medications. Follow up with primary care physician and cardiologist for management. continue home medication

## 2024-08-01 ENCOUNTER — APPOINTMENT (OUTPATIENT)
Dept: NEUROLOGY | Facility: CLINIC | Age: 84
End: 2024-08-01

## 2025-07-16 ENCOUNTER — APPOINTMENT (OUTPATIENT)
Dept: VASCULAR SURGERY | Facility: CLINIC | Age: 85
End: 2025-07-16
Payer: MEDICARE

## 2025-07-16 VITALS — WEIGHT: 161 LBS | HEIGHT: 70 IN | BODY MASS INDEX: 23.05 KG/M2

## 2025-07-16 LAB
ALBUMIN SERPL ELPH-MCNC: 4.6 G/DL
ALP BLD-CCNC: 100 U/L
ALT SERPL-CCNC: 28 U/L
ANION GAP SERPL CALC-SCNC: 17 MMOL/L
APTT BLD: 39.2 SEC
AST SERPL-CCNC: 30 U/L
BILIRUB SERPL-MCNC: 0.7 MG/DL
BUN SERPL-MCNC: 17 MG/DL
CALCIUM SERPL-MCNC: 9.8 MG/DL
CHLORIDE SERPL-SCNC: 105 MMOL/L
CO2 SERPL-SCNC: 23 MMOL/L
CREAT SERPL-MCNC: 0.97 MG/DL
EGFRCR SERPLBLD CKD-EPI 2021: 76 ML/MIN/1.73M2
GLUCOSE SERPL-MCNC: 105 MG/DL
HCT VFR BLD CALC: 39.8 %
HGB BLD-MCNC: 13 G/DL
INR PPP: 1.26 RATIO
MCHC RBC-ENTMCNC: 32.7 G/DL
MCHC RBC-ENTMCNC: 33.1 PG
MCV RBC AUTO: 101.3 FL
PLATELET # BLD AUTO: 142 K/UL
POTASSIUM SERPL-SCNC: 4.4 MMOL/L
PROT SERPL-MCNC: 6.9 G/DL
PT BLD: 14.8 SEC
RBC # BLD: 3.93 M/UL
RBC # FLD: 13.4 %
SODIUM SERPL-SCNC: 145 MMOL/L
WBC # FLD AUTO: 7.75 K/UL

## 2025-07-16 PROCEDURE — 93923 UPR/LXTR ART STDY 3+ LVLS: CPT

## 2025-07-16 PROCEDURE — 99205 OFFICE O/P NEW HI 60 MIN: CPT

## 2025-07-16 PROCEDURE — G2211 COMPLEX E/M VISIT ADD ON: CPT

## 2025-07-16 RX ORDER — AMOXICILLIN AND CLAVULANATE POTASSIUM 875; 125 MG/1; MG/1
875-125 TABLET, COATED ORAL TWICE DAILY
Qty: 30 | Refills: 0 | Status: ACTIVE | COMMUNITY
Start: 2025-07-16 | End: 1900-01-01

## 2025-07-25 PROBLEM — I70.299 ATHEROSCLEROSIS OF ARTERY OF EXTREMITY WITH ULCERATION: Status: ACTIVE | Noted: 2025-07-16

## 2025-07-25 PROBLEM — I48.91 AFIB: Status: ACTIVE | Noted: 2025-07-25

## 2025-07-25 PROBLEM — I25.10 CORONARY DISEASE: Status: ACTIVE | Noted: 2025-07-25

## 2025-07-28 ENCOUNTER — APPOINTMENT (OUTPATIENT)
Dept: ENDOVASCULAR SURGERY | Facility: CLINIC | Age: 85
End: 2025-07-28
Payer: MEDICARE

## 2025-07-28 ENCOUNTER — RESULT REVIEW (OUTPATIENT)
Age: 85
End: 2025-07-28

## 2025-07-28 DIAGNOSIS — M10.9 GOUT, UNSPECIFIED: ICD-10-CM

## 2025-07-28 DIAGNOSIS — I25.10 ATHEROSCLEROTIC HEART DISEASE OF NATIVE CORONARY ARTERY W/OUT ANGINA PECTORIS: ICD-10-CM

## 2025-07-28 DIAGNOSIS — I70.299 OTHER ATHEROSCLEROSIS OF NATIVE ARTERIES OF EXTREMITIES, UNSPECIFIED EXTREMITY: ICD-10-CM

## 2025-07-28 DIAGNOSIS — L97.909 OTHER ATHEROSCLEROSIS OF NATIVE ARTERIES OF EXTREMITIES, UNSPECIFIED EXTREMITY: ICD-10-CM

## 2025-07-28 DIAGNOSIS — I10 ESSENTIAL (PRIMARY) HYPERTENSION: ICD-10-CM

## 2025-07-28 DIAGNOSIS — G62.9 POLYNEUROPATHY, UNSPECIFIED: ICD-10-CM

## 2025-07-28 DIAGNOSIS — I48.91 UNSPECIFIED ATRIAL FIBRILLATION: ICD-10-CM

## 2025-07-28 PROCEDURE — 37229Z: CUSTOM | Mod: 82,RT

## 2025-07-28 PROCEDURE — 76937 US GUIDE VASCULAR ACCESS: CPT

## 2025-07-28 PROCEDURE — 37229Z: CUSTOM | Mod: RT

## 2025-07-28 PROCEDURE — 75625 CONTRAST EXAM ABDOMINL AORTA: CPT

## 2025-07-28 RX ORDER — PANTOPRAZOLE 40 MG/1
40 TABLET, DELAYED RELEASE ORAL
Refills: 0 | Status: ACTIVE | COMMUNITY

## 2025-07-28 RX ORDER — APIXABAN 5 MG/1
5 TABLET, FILM COATED ORAL
Qty: 120 | Refills: 0 | Status: ACTIVE | COMMUNITY
Start: 2025-04-16

## 2025-07-28 RX ORDER — SOTALOL HYDROCHLORIDE TABLES AF 80 MG/1
80 TABLET ORAL
Refills: 0 | Status: ACTIVE | COMMUNITY

## 2025-07-28 RX ORDER — PRASUGREL 5 MG/1
5 TABLET, FILM COATED ORAL
Refills: 0 | Status: ACTIVE | COMMUNITY

## 2025-07-28 RX ORDER — MULTIVIT-MIN/IRON/FOLIC ACID/K 18-600-40
400 CAPSULE ORAL
Refills: 0 | Status: ACTIVE | COMMUNITY

## 2025-07-28 RX ORDER — GABAPENTIN 300 MG/1
300 TABLET ORAL
Refills: 0 | Status: ACTIVE | COMMUNITY

## 2025-07-28 RX ORDER — ENALAPRIL MALEATE 10 MG/1
10 TABLET ORAL
Refills: 0 | Status: ACTIVE | COMMUNITY

## 2025-07-28 RX ORDER — FINASTERIDE 5 MG/1
5 TABLET, FILM COATED ORAL
Refills: 0 | Status: ACTIVE | COMMUNITY

## 2025-07-28 RX ORDER — ALPHA LIPOIC ACID 100 MG
100 CAPSULE ORAL
Refills: 0 | Status: ACTIVE | COMMUNITY

## 2025-07-28 RX ORDER — ALLOPURINOL 300 MG/1
300 TABLET ORAL
Qty: 90 | Refills: 0 | Status: ACTIVE | COMMUNITY
Start: 2025-01-21

## 2025-07-28 RX ORDER — SENNOSIDES 8.6 MG
TABLET ORAL
Refills: 0 | Status: ACTIVE | COMMUNITY

## 2025-07-28 RX ORDER — MELATONIN 2.5MG/10ML
600 LIQUID (ML) ORAL
Refills: 0 | Status: ACTIVE | COMMUNITY

## 2025-07-28 RX ORDER — ATORVASTATIN CALCIUM 40 MG/1
40 TABLET, FILM COATED ORAL
Qty: 30 | Refills: 0 | Status: ACTIVE | COMMUNITY
Start: 2025-07-25

## 2025-07-28 RX ORDER — LACTOBACILLUS ACIDOPHILUS/PECT 30 MG-20MG
TABLET ORAL
Refills: 0 | Status: ACTIVE | COMMUNITY

## 2025-08-20 ENCOUNTER — APPOINTMENT (OUTPATIENT)
Dept: VASCULAR SURGERY | Facility: CLINIC | Age: 85
End: 2025-08-20
Payer: MEDICARE

## 2025-08-20 PROCEDURE — 93926 LOWER EXTREMITY STUDY: CPT | Mod: RT

## 2025-08-20 PROCEDURE — 99215 OFFICE O/P EST HI 40 MIN: CPT
